# Patient Record
Sex: MALE | Race: WHITE
[De-identification: names, ages, dates, MRNs, and addresses within clinical notes are randomized per-mention and may not be internally consistent; named-entity substitution may affect disease eponyms.]

---
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
## 2025-03-20 ENCOUNTER — OFFICE VISIT (OUTPATIENT)
Dept: URGENT CARE | Facility: URGENT CARE | Age: 20
End: 2025-03-20
Payer: COMMERCIAL

## 2025-03-20 VITALS
WEIGHT: 176 LBS | RESPIRATION RATE: 18 BRPM | TEMPERATURE: 97 F | HEIGHT: 72 IN | OXYGEN SATURATION: 99 % | HEART RATE: 55 BPM | DIASTOLIC BLOOD PRESSURE: 79 MMHG | SYSTOLIC BLOOD PRESSURE: 121 MMHG | BODY MASS INDEX: 23.84 KG/M2

## 2025-03-20 DIAGNOSIS — H00.011 HORDEOLUM EXTERNUM OF RIGHT UPPER EYELID: Primary | ICD-10-CM

## 2025-03-20 RX ORDER — ERYTHROMYCIN 5 MG/G
0.5 OINTMENT OPHTHALMIC 4 TIMES DAILY
Qty: 3.5 G | Refills: 0 | Status: SHIPPED | OUTPATIENT
Start: 2025-03-20 | End: 2025-03-27

## 2025-03-20 NOTE — PROGRESS NOTES
Chief Complaint   Patient presents with    Urgent Care     Pt states starting last night right eye has some swelling.No hx of stye. He take over the counter [facility] allergy.         ICD-10-CM    1. Hordeolum externum of right upper eyelid  H00.011 erythromycin (ROMYCIN) 5 MG/GM ophthalmic ointment      Warm compresses, erythromycin ointment, follow-up as needed    Red flag warning signs and when to go to the emergency room discussed.  Reviewed potential adverse reactions to medications.      Subjective     Candido Hunt is an 20 year old male who presents to clinic today for right eye swelling since last night.  He denies changes in vision, drainage from the eye, trauma to the eye.      Objective    /79   Pulse 55   Temp 97  F (36.1  C) (Tympanic)   Resp 18   Ht 1.829 m (6')   Wt 79.8 kg (176 lb)   SpO2 99%   BMI 23.87 kg/m    Nurses notes and VS have been reviewed.    Physical Exam       GENERAL APPEARANCE: healthy appearing, alert     EYES: PERRL and eyelids- hordeolum/sty OD     HENT: oral exam benign, mucus membranes intact, without ulcers or lesions     NECK: no adenopathy or asymmetry, thyroid normal to palpation     SKIN: Exposed skin shows no suspicious lesions or rashes      TOOTIE Bettencourt, CNP  [facility] Provider    The use of Dragon/[employer] dictation services may have been used to construct the content in this note; any grammatical or spelling errors are non-intentional. Please contact the author of this note directly if you are in need of any clarification.

## 2025-07-07 ENCOUNTER — OFFICE VISIT (OUTPATIENT)
Dept: URGENT CARE | Facility: URGENT CARE | Age: 20
End: 2025-07-07
Payer: COMMERCIAL

## 2025-07-07 VITALS
SYSTOLIC BLOOD PRESSURE: 125 MMHG | OXYGEN SATURATION: 99 % | WEIGHT: 170.4 LBS | TEMPERATURE: 97.8 F | HEART RATE: 80 BPM | RESPIRATION RATE: 21 BRPM | BODY MASS INDEX: 23.08 KG/M2 | HEIGHT: 72 IN | DIASTOLIC BLOOD PRESSURE: 84 MMHG

## 2025-07-07 DIAGNOSIS — H60.392 INFECTIVE OTITIS EXTERNA, LEFT: Primary | ICD-10-CM

## 2025-07-07 PROCEDURE — 3074F SYST BP LT 130 MM HG: CPT | Performed by: FAMILY MEDICINE

## 2025-07-07 PROCEDURE — 99213 OFFICE O/P EST LOW 20 MIN: CPT | Performed by: FAMILY MEDICINE

## 2025-07-07 PROCEDURE — 3079F DIAST BP 80-89 MM HG: CPT | Performed by: FAMILY MEDICINE

## 2025-07-07 RX ORDER — NEOMYCIN SULFATE, POLYMYXIN B SULFATE AND HYDROCORTISONE 10; 3.5; 1 MG/ML; MG/ML; [USP'U]/ML
3 SUSPENSION/ DROPS AURICULAR (OTIC) 4 TIMES DAILY
Qty: 10 ML | Refills: 0 | Status: SHIPPED | OUTPATIENT
Start: 2025-07-07 | End: 2025-07-17

## 2025-07-07 NOTE — PROGRESS NOTES
"SUBJECTIVE:Candido Hunt is a 20 year old male who presents with left ear painfor day(s).   Severity: moderate   Timing:still present and worsening    Past Medical History:   Diagnosis Date    Unspecified otitis media 3rd 1/06     No Known Allergies  Social History     Tobacco Use    Smoking status: Never    Smokeless tobacco: Never   Substance Use Topics    Alcohol use: No       ROS: CONSTITUTIONAL:NEGATIVE for fever, chills, change in weight    OBJECTIVE:  /84 (BP Location: Left arm, Patient Position: Sitting, Cuff Size: Adult Regular)   Pulse 80   Temp 97.8  F (36.6  C) (Oral)   Resp 21   Ht 1.816 m (5' 11.5\")   Wt 77.3 kg (170 lb 6.4 oz)   SpO2 99%   BMI 23.43 kg/m     The right TM is normal: no effusions, no erythema, and normal landmarks     The right auditory canal is normal and without drainage, edema or erythema  The left TM is normal: no effusions, no erythema, and normal landmarks  The left auditory canal is erythematous, swollen, tender  Oropharynx exam is normal: no lesions, erythema, adenopathy or exudate.GENERAL: no acute distress  EYES: EOMI,  PERRL, conjunctiva clear  SKIN: no suspicious lesions or rashes       ICD-10-CM    1. Infective otitis externa, left  H60.392 neomycin-polymyxin-hydrocortisone (CORTISPORIN) [id number] otic suspension          F/U PCP/IM/FP/UC if worse, not any better    "

## 2025-07-07 NOTE — PROGRESS NOTES
Urgent Care Clinic Visit    Chief Complaint   Patient presents with    Ear Problem     Ear pain for the last few days.                7/7/2025    10:18 AM   Additional Questions   Roomed by Garo Pennington MA   Accompanied by Christen- Girlfriend